# Patient Record
Sex: FEMALE | Race: WHITE | NOT HISPANIC OR LATINO | URBAN - METROPOLITAN AREA
[De-identification: names, ages, dates, MRNs, and addresses within clinical notes are randomized per-mention and may not be internally consistent; named-entity substitution may affect disease eponyms.]

---

## 2024-02-20 ENCOUNTER — OV NP (OUTPATIENT)
Dept: URBAN - METROPOLITAN AREA CLINIC 111 | Facility: CLINIC | Age: 44
End: 2024-02-20

## 2024-02-20 VITALS
HEART RATE: 81 BPM | WEIGHT: 136 LBS | DIASTOLIC BLOOD PRESSURE: 72 MMHG | TEMPERATURE: 97.7 F | SYSTOLIC BLOOD PRESSURE: 135 MMHG | HEIGHT: 65 IN | BODY MASS INDEX: 22.66 KG/M2

## 2024-02-20 RX ORDER — OMEPRAZOLE MAGNESIUM 2.5 MG/1
AS DIRECTED GRANULE, DELAYED RELEASE ORAL
Status: ACTIVE | COMMUNITY

## 2024-02-20 RX ORDER — RIMEGEPANT SULFATE 75 MG/75MG
1 TABLET ON THE TONGUE AND ALLOW TO DISSOLVE TABLET, ORALLY DISINTEGRATING ORAL
Status: ACTIVE | COMMUNITY

## 2024-02-20 NOTE — HPI-TODAY'S VISIT:
43F Colon2011--normal to TI with normal bx EGD 2011--normal w/ normal bx . c/o nausea as soon as she wakes up. going on since Jan 2024 c/o associated nausea, epig pain. pain is mild to moderate, constant she was started on carafate and prilosec which helped her. she stopped them symptoms returned when she ate pizza she was restarted on prilosec 40 qd. she also had US of the abd and says it was normal symptoms again improved with the prilosec. no dysphagia she lost 15 lb since Jan because she doesnt want to eat due to the nausea BM--constipation. she uses a stool softner prn Uses NSAIDs a couple of times a times

## 2024-04-16 ENCOUNTER — EGD (OUTPATIENT)
Dept: URBAN - METROPOLITAN AREA LAB 3 | Facility: LAB | Age: 44
End: 2024-04-16

## 2024-07-02 ENCOUNTER — OFFICE VISIT (OUTPATIENT)
Dept: URBAN - METROPOLITAN AREA LAB 3 | Facility: LAB | Age: 44
End: 2024-07-02